# Patient Record
Sex: FEMALE | Race: WHITE | NOT HISPANIC OR LATINO | URBAN - METROPOLITAN AREA
[De-identification: names, ages, dates, MRNs, and addresses within clinical notes are randomized per-mention and may not be internally consistent; named-entity substitution may affect disease eponyms.]

---

## 2023-11-14 PROBLEM — L50.9 URTICARIA: Status: ACTIVE | Noted: 2023-11-14

## 2023-11-15 ENCOUNTER — OFFICE VISIT (OUTPATIENT)
Dept: PRIMARY CARE | Facility: CLINIC | Age: 68
End: 2023-11-15
Payer: MEDICARE

## 2023-11-15 VITALS
TEMPERATURE: 97.8 F | HEIGHT: 68 IN | OXYGEN SATURATION: 97 % | WEIGHT: 149.8 LBS | BODY MASS INDEX: 22.7 KG/M2 | DIASTOLIC BLOOD PRESSURE: 76 MMHG | SYSTOLIC BLOOD PRESSURE: 122 MMHG | HEART RATE: 84 BPM

## 2023-11-15 DIAGNOSIS — R73.09 ABNORMAL GLUCOSE: ICD-10-CM

## 2023-11-15 DIAGNOSIS — R53.83 FATIGUE, UNSPECIFIED TYPE: ICD-10-CM

## 2023-11-15 DIAGNOSIS — Z78.0 ASYMPTOMATIC MENOPAUSAL STATE: ICD-10-CM

## 2023-11-15 DIAGNOSIS — Z13.6 SCREENING FOR CARDIOVASCULAR CONDITION: ICD-10-CM

## 2023-11-15 DIAGNOSIS — E55.9 VITAMIN D DEFICIENCY: ICD-10-CM

## 2023-11-15 DIAGNOSIS — Z00.00 ROUTINE GENERAL MEDICAL EXAMINATION AT HEALTH CARE FACILITY: Primary | ICD-10-CM

## 2023-11-15 DIAGNOSIS — Z11.59 NEED FOR HEPATITIS C SCREENING TEST: ICD-10-CM

## 2023-11-15 DIAGNOSIS — Z12.31 ENCOUNTER FOR SCREENING MAMMOGRAM FOR BREAST CANCER: ICD-10-CM

## 2023-11-15 DIAGNOSIS — Z23 NEED FOR VACCINATION: ICD-10-CM

## 2023-11-15 DIAGNOSIS — Z13.1 DIABETES MELLITUS SCREENING: ICD-10-CM

## 2023-11-15 PROCEDURE — 1170F FXNL STATUS ASSESSED: CPT | Performed by: NURSE PRACTITIONER

## 2023-11-15 PROCEDURE — 3008F BODY MASS INDEX DOCD: CPT | Performed by: NURSE PRACTITIONER

## 2023-11-15 PROCEDURE — 90715 TDAP VACCINE 7 YRS/> IM: CPT | Performed by: NURSE PRACTITIONER

## 2023-11-15 PROCEDURE — 1160F RVW MEDS BY RX/DR IN RCRD: CPT | Performed by: NURSE PRACTITIONER

## 2023-11-15 PROCEDURE — 1126F AMNT PAIN NOTED NONE PRSNT: CPT | Performed by: NURSE PRACTITIONER

## 2023-11-15 PROCEDURE — 93000 ELECTROCARDIOGRAM COMPLETE: CPT | Performed by: NURSE PRACTITIONER

## 2023-11-15 PROCEDURE — 1159F MED LIST DOCD IN RCRD: CPT | Performed by: NURSE PRACTITIONER

## 2023-11-15 PROCEDURE — 1036F TOBACCO NON-USER: CPT | Performed by: NURSE PRACTITIONER

## 2023-11-15 PROCEDURE — G0438 PPPS, INITIAL VISIT: HCPCS | Performed by: NURSE PRACTITIONER

## 2023-11-15 PROCEDURE — 90677 PCV20 VACCINE IM: CPT | Performed by: NURSE PRACTITIONER

## 2023-11-15 PROCEDURE — 90471 IMMUNIZATION ADMIN: CPT | Performed by: NURSE PRACTITIONER

## 2023-11-15 PROCEDURE — G0009 ADMIN PNEUMOCOCCAL VACCINE: HCPCS | Performed by: NURSE PRACTITIONER

## 2023-11-15 ASSESSMENT — ENCOUNTER SYMPTOMS
LOSS OF SENSATION IN FEET: 0
OCCASIONAL FEELINGS OF UNSTEADINESS: 0
DEPRESSION: 0

## 2023-11-15 ASSESSMENT — ACTIVITIES OF DAILY LIVING (ADL)
BATHING: INDEPENDENT
TAKING_MEDICATION: INDEPENDENT
GROCERY_SHOPPING: INDEPENDENT
DRESSING: INDEPENDENT
DOING_HOUSEWORK: INDEPENDENT
MANAGING_FINANCES: INDEPENDENT

## 2023-11-15 ASSESSMENT — PATIENT HEALTH QUESTIONNAIRE - PHQ9
2. FEELING DOWN, DEPRESSED OR HOPELESS: NOT AT ALL
1. LITTLE INTEREST OR PLEASURE IN DOING THINGS: NOT AT ALL
SUM OF ALL RESPONSES TO PHQ9 QUESTIONS 1 AND 2: 0

## 2023-11-15 ASSESSMENT — PAIN SCALES - GENERAL: PAINLEVEL: 0-NO PAIN

## 2023-11-15 NOTE — PROGRESS NOTES
"Subjective   Reason for Visit: Humaira Paulson is an 68 y.o. female here for a Medicare Wellness visit.     Past Medical, Surgical, and Family History reviewed and updated in chart.    Reviewed all medications by prescribing practitioner or clinical pharmacist (such as prescriptions, OTCs, herbal therapies and supplements) and documented in the medical record.    Needs work clearance for  job.  Now retired but planning to work part time.  Needs Td booster  No know TB exposure or recent travel out of the country    Colonoscopy: 4/2021: repeat 3 years    DEXA 2 years ago, osteopenia    Mammogram in 2021    Had 2 shingles vaccines.    Had hearing screening. Needs hearing aids. Is looking into getting with insurance.   Loss of hearing has effected daily activities.        Patient Care Team:  JARRETT Giles as PCP - General (Family Medicine)     Review of Systems    Objective   Vitals:  /76 (BP Location: Right arm, Patient Position: Sitting, BP Cuff Size: Large adult)   Pulse 84   Temp 36.6 °C (97.8 °F) (Oral)   Ht 1.727 m (5' 8\")   Wt 67.9 kg (149 lb 12.8 oz)   SpO2 97%   BMI 22.78 kg/m²       Physical Exam  Vitals and nursing note reviewed.   Constitutional:       General: She is not in acute distress.     Appearance: Normal appearance. She is normal weight. She is not toxic-appearing.   HENT:      Head: Normocephalic.      Right Ear: Tympanic membrane, ear canal and external ear normal.      Left Ear: Tympanic membrane, ear canal and external ear normal.      Nose: Nose normal.      Mouth/Throat:      Mouth: Mucous membranes are moist.      Pharynx: Oropharynx is clear.   Eyes:      Conjunctiva/sclera: Conjunctivae normal.   Neck:      Thyroid: No thyromegaly.   Cardiovascular:      Rate and Rhythm: Normal rate and regular rhythm.      Pulses: Normal pulses.      Heart sounds: Normal heart sounds. No murmur heard.  Pulmonary:      Effort: Pulmonary effort is normal. No respiratory distress. "      Breath sounds: Normal breath sounds.   Abdominal:      General: Bowel sounds are normal.      Palpations: Abdomen is soft.      Tenderness: There is no abdominal tenderness.   Musculoskeletal:         General: Normal range of motion.      Cervical back: Neck supple.   Lymphadenopathy:      Cervical: No cervical adenopathy.   Skin:     General: Skin is warm and dry.      Capillary Refill: Capillary refill takes less than 2 seconds.      Findings: No rash.   Neurological:      General: No focal deficit present.      Gait: Gait normal.   Psychiatric:         Mood and Affect: Mood normal.         Judgment: Judgment normal.         Assessment/Plan   Problem List Items Addressed This Visit    None  Visit Diagnoses       Routine general medical examination at health care facility    -  Primary    Relevant Orders    1 Year Follow Up In [facility] - PCP - Wellness Exam    Diabetes mellitus screening        Relevant Orders    Comprehensive Metabolic Panel    Screening for cardiovascular condition        Relevant Orders    ECG 12 lead (Completed)    Lipid panel    Need for vaccination        Relevant Orders    Pneumococcal conjugate vaccine 20-valent IM (Completed)    Tdap vaccine, age 7 years and older  (BOOSTRIX) (Completed)    Asymptomatic menopausal state        Relevant Orders    XR DEXA bone density    Encounter for screening mammogram for breast cancer        Relevant Orders    BI mammo bilateral screening tomosynthesis    Need for hepatitis C screening test        Relevant Orders    Hepatitis C antibody    Fatigue, unspecified type        Relevant Orders    CBC    Hemoglobin A1c    Vitamin B12    Vitamin D 25-Hydroxy,Total (for eval of Vitamin D levels)    Vitamin D deficiency        Relevant Orders    Vitamin D 25-Hydroxy,Total (for eval of Vitamin D levels)    Abnormal glucose        Relevant Orders    Hemoglobin A1c    BMI 22.0-22.9, adult

## 2023-11-15 NOTE — PROGRESS NOTES
"Subjective   Patient ID: Humaira Paulson is a 68 y.o. female who presents for Annual Exam and Welcome To Medicare.    Presents for follow up for     Vit D, calc, B12    Review of Systems    Objective   /76 (BP Location: Right arm, Patient Position: Sitting, BP Cuff Size: Large adult)   Pulse 84   Temp 36.6 °C (97.8 °F) (Oral)   Ht 1.727 m (5' 8\")   Wt 67.9 kg (149 lb 12.8 oz)   SpO2 97%   BMI 22.78 kg/m²     Physical Exam    Assessment/Plan   There are no diagnoses linked to this encounter.       "

## 2023-11-15 NOTE — PATIENT INSTRUCTIONS
EKG in office today.    Fasting blood work ordered.    Cleared for work - form completed in office.    Schedule screening mammogram and bone density testing    Colonoscopy due in 2024    Prevnar and Tdap updated.    Follow up with [facility] or [facility] [phone] for hearing aids    Follow up in one year and as needed.

## 2023-12-05 ENCOUNTER — TELEPHONE (OUTPATIENT)
Dept: PRIMARY CARE | Facility: CLINIC | Age: 68
End: 2023-12-05
Payer: MEDICARE

## 2024-04-10 ENCOUNTER — APPOINTMENT (OUTPATIENT)
Dept: PRIMARY CARE | Facility: CLINIC | Age: 69
End: 2024-04-10
Payer: MEDICARE

## 2024-04-12 ENCOUNTER — OFFICE VISIT (OUTPATIENT)
Dept: PRIMARY CARE | Facility: CLINIC | Age: 69
End: 2024-04-12
Payer: MEDICARE

## 2024-04-12 VITALS
HEIGHT: 68 IN | SYSTOLIC BLOOD PRESSURE: 124 MMHG | DIASTOLIC BLOOD PRESSURE: 76 MMHG | WEIGHT: 151 LBS | HEART RATE: 84 BPM | BODY MASS INDEX: 22.88 KG/M2 | OXYGEN SATURATION: 98 %

## 2024-04-12 DIAGNOSIS — J30.2 SEASONAL ALLERGIES: ICD-10-CM

## 2024-04-12 DIAGNOSIS — R21 MALAR RASH: Primary | ICD-10-CM

## 2024-04-12 LAB
ALBUMIN SERPL BCP-MCNC: 4.2 G/DL (ref 3.4–5)
ALP SERPL-CCNC: 69 U/L (ref 33–136)
ALT SERPL W P-5'-P-CCNC: 35 U/L (ref 7–45)
ANION GAP SERPL CALC-SCNC: 15 MMOL/L (ref 10–20)
AST SERPL W P-5'-P-CCNC: 23 U/L (ref 9–39)
BILIRUB SERPL-MCNC: 0.4 MG/DL (ref 0–1.2)
BUN SERPL-MCNC: 15 MG/DL (ref 6–23)
CALCIUM SERPL-MCNC: 9.5 MG/DL (ref 8.6–10.6)
CHLORIDE SERPL-SCNC: 105 MMOL/L (ref 98–107)
CO2 SERPL-SCNC: 25 MMOL/L (ref 21–32)
CREAT SERPL-MCNC: 0.68 MG/DL (ref 0.5–1.05)
EGFRCR SERPLBLD CKD-EPI 2021: >90 ML/MIN/1.73M*2
GLUCOSE SERPL-MCNC: 115 MG/DL (ref 74–99)
POTASSIUM SERPL-SCNC: 4.1 MMOL/L (ref 3.5–5.3)
PROT SERPL-MCNC: 6.9 G/DL (ref 6.4–8.2)
SODIUM SERPL-SCNC: 141 MMOL/L (ref 136–145)

## 2024-04-12 PROCEDURE — 86038 ANTINUCLEAR ANTIBODIES: CPT

## 2024-04-12 PROCEDURE — 3008F BODY MASS INDEX DOCD: CPT | Performed by: FAMILY MEDICINE

## 2024-04-12 PROCEDURE — 86235 NUCLEAR ANTIGEN ANTIBODY: CPT

## 2024-04-12 PROCEDURE — 36415 COLL VENOUS BLD VENIPUNCTURE: CPT

## 2024-04-12 PROCEDURE — 86225 DNA ANTIBODY NATIVE: CPT

## 2024-04-12 PROCEDURE — 1158F ADVNC CARE PLAN TLK DOCD: CPT | Performed by: FAMILY MEDICINE

## 2024-04-12 PROCEDURE — 82785 ASSAY OF IGE: CPT

## 2024-04-12 PROCEDURE — 99214 OFFICE O/P EST MOD 30 MIN: CPT | Performed by: FAMILY MEDICINE

## 2024-04-12 PROCEDURE — 80053 COMPREHEN METABOLIC PANEL: CPT

## 2024-04-12 PROCEDURE — 1123F ACP DISCUSS/DSCN MKR DOCD: CPT | Performed by: FAMILY MEDICINE

## 2024-04-12 PROCEDURE — 1159F MED LIST DOCD IN RCRD: CPT | Performed by: FAMILY MEDICINE

## 2024-04-12 PROCEDURE — 85025 COMPLETE CBC W/AUTO DIFF WBC: CPT

## 2024-04-12 PROCEDURE — 1036F TOBACCO NON-USER: CPT | Performed by: FAMILY MEDICINE

## 2024-04-12 PROCEDURE — 86003 ALLG SPEC IGE CRUDE XTRC EA: CPT

## 2024-04-12 PROCEDURE — 85652 RBC SED RATE AUTOMATED: CPT

## 2024-04-12 RX ORDER — PREDNISONE 20 MG/1
TABLET ORAL
Qty: 18 TABLET | Refills: 0 | Status: SHIPPED | OUTPATIENT
Start: 2024-04-12 | End: 2024-06-10 | Stop reason: ALTCHOICE

## 2024-04-12 ASSESSMENT — ENCOUNTER SYMPTOMS
OCCASIONAL FEELINGS OF UNSTEADINESS: 0
LOSS OF SENSATION IN FEET: 0
DEPRESSION: 0

## 2024-04-12 NOTE — PROGRESS NOTES
"Subjective   Patient ID: Humaira Paulson is a 68 y.o. female who presents for Rash.    HPI   Presents for rash.  States happens every year from winter to spring. Gets a lot of redness and tightness, feels uncomfortable.  Migrates around the face.  Nothing home helps but when prescribed oral steroids it resolves the rash.  States going on for several years.  Saw derm and diagnosed with rosacea.  Does not get papules, always spares the nose but can be anywhere else on neck or face.  Review of Systems  Negative unless noted in HPI    Objective   /76 (BP Location: Right arm, Patient Position: Sitting, BP Cuff Size: Adult)   Pulse 84   Ht 1.727 m (5' 8\")   Wt 68.5 kg (151 lb)   LMP  (LMP Unknown)   SpO2 98%   BMI 22.96 kg/m²     Physical Exam  Constitutional:       Appearance: Normal appearance.   Pulmonary:      Effort: Pulmonary effort is normal.   Skin:     Comments: Erythematous rash noted on the chin and around the nasolabial folds, minimally on the cheeks, sparingly erythema on forehead, nose is completely free of erythema, no papules or comedones noted   Neurological:      Mental Status: She is alert.         Assessment/Plan   Problem List Items Addressed This Visit             ICD-10-CM    Malar rash - Primary R21     Atypical distribution and appearance of rash  While could be rosacea it is odd that it responds to steroids but not metrogel  Seasonal nature of the symptoms also is atypical  Recommend lab testing to evaluate for autoimmune inflammatory cause  Will treat with steroid  Consider dermatology reevaluation         Relevant Medications    predniSONE (Deltasone) 20 mg tablet    Other Relevant Orders    JERILYN with Reflex to JUWAN    Sedimentation Rate    CBC and Auto Differential    Comprehensive metabolic panel    Respiratory Allergy Profile IgE     Other Visit Diagnoses         Codes    Seasonal allergies     J30.2    Relevant Orders    Respiratory Allergy Profile IgE               "

## 2024-04-12 NOTE — ASSESSMENT & PLAN NOTE
Atypical distribution and appearance of rash  While could be rosacea it is odd that it responds to steroids but not metrogel  Seasonal nature of the symptoms also is atypical  Recommend lab testing to evaluate for autoimmune inflammatory cause  Will treat with steroid  Consider dermatology reevaluation

## 2024-04-13 LAB
A ALTERNATA IGE QN: <0.1 KU/L
A FUMIGATUS IGE QN: <0.1 KU/L
BASOPHILS # BLD AUTO: 0.03 X10*3/UL (ref 0–0.1)
BASOPHILS NFR BLD AUTO: 0.5 %
BERMUDA GRASS IGE QN: <0.1 KU/L
BOXELDER IGE QN: <0.1 KU/L
C HERBARUM IGE QN: <0.1 KU/L
CALIF WALNUT POLN IGE QN: <0.1 KU/L
CAT DANDER IGE QN: <0.1 KU/L
CMN PIGWEED IGE QN: <0.1 KU/L
COMMON RAGWEED IGE QN: <0.1 KU/L
COTTONWOOD IGE QN: 0.14 KU/L
D FARINAE IGE QN: <0.1 KU/L
D PTERONYSS IGE QN: <0.1 KU/L
DOG DANDER IGE QN: <0.1 KU/L
ENGL PLANTAIN IGE QN: <0.1 KU/L
EOSINOPHIL # BLD AUTO: 0.09 X10*3/UL (ref 0–0.7)
EOSINOPHIL NFR BLD AUTO: 1.5 %
ERYTHROCYTE [DISTWIDTH] IN BLOOD BY AUTOMATED COUNT: 12.9 % (ref 11.5–14.5)
ERYTHROCYTE [SEDIMENTATION RATE] IN BLOOD BY WESTERGREN METHOD: 9 MM/H (ref 0–30)
GOOSEFOOT IGE QN: <0.1 KU/L
HCT VFR BLD AUTO: 39.5 % (ref 36–46)
HGB BLD-MCNC: 12.3 G/DL (ref 12–16)
IMM GRANULOCYTES # BLD AUTO: 0.01 X10*3/UL (ref 0–0.7)
IMM GRANULOCYTES NFR BLD AUTO: 0.2 % (ref 0–0.9)
JOHNSON GRASS IGE QN: <0.1 KU/L
KENT BLUE GRASS IGE QN: <0.1 KU/L
LONDON PLANE IGE QN: <0.1 KU/L
LYMPHOCYTES # BLD AUTO: 2.22 X10*3/UL (ref 1.2–4.8)
LYMPHOCYTES NFR BLD AUTO: 36.1 %
MCH RBC QN AUTO: 27.8 PG (ref 26–34)
MCHC RBC AUTO-ENTMCNC: 31.1 G/DL (ref 32–36)
MCV RBC AUTO: 89 FL (ref 80–100)
MONOCYTES # BLD AUTO: 0.3 X10*3/UL (ref 0.1–1)
MONOCYTES NFR BLD AUTO: 4.9 %
MT JUNIPER IGE QN: <0.1 KU/L
NEUTROPHILS # BLD AUTO: 3.5 X10*3/UL (ref 1.2–7.7)
NEUTROPHILS NFR BLD AUTO: 56.8 %
NRBC BLD-RTO: 0 /100 WBCS (ref 0–0)
P NOTATUM IGE QN: <0.1 KU/L
PECAN/HICK TREE IGE QN: <0.1 KU/L
PLATELET # BLD AUTO: 247 X10*3/UL (ref 150–450)
RBC # BLD AUTO: 4.42 X10*6/UL (ref 4–5.2)
ROACH IGE QN: <0.1 KU/L
SALTWORT IGE QN: <0.1 KU/L
SHEEP SORREL IGE QN: <0.1 KU/L
SILVER BIRCH IGE QN: <0.1 KU/L
TIMOTHY IGE QN: <0.1 KU/L
TOTAL IGE SMQN RAST: 10.7 KU/L
WBC # BLD AUTO: 6.2 X10*3/UL (ref 4.4–11.3)
WHITE ASH IGE QN: <0.1 KU/L
WHITE ELM IGE QN: <0.1 KU/L
WHITE MULBERRY IGE QN: <0.1 KU/L
WHITE OAK IGE QN: <0.1 KU/L

## 2024-04-15 LAB
ANA PATTERN: ABNORMAL
ANA SER QL HEP2 SUBST: POSITIVE
ANA TITR SER IF: ABNORMAL {TITER}
CENTROMERE B AB SER-ACNC: <0.2 AI
CHROMATIN AB SERPL-ACNC: <0.2 AI
DSDNA AB SER-ACNC: 1 IU/ML
ENA JO1 AB SER QL IA: <0.2 AI
ENA RNP AB SER IA-ACNC: 2.5 AI
ENA SCL70 AB SER QL IA: <0.2 AI
ENA SM AB SER IA-ACNC: <0.2 AI
ENA SM+RNP AB SER QL IA: <0.2 AI
ENA SS-A AB SER IA-ACNC: <0.2 AI
ENA SS-B AB SER IA-ACNC: <0.2 AI
RIBOSOMAL P AB SER-ACNC: <0.2 AI

## 2024-04-16 DIAGNOSIS — R76.8 POSITIVE ANA (ANTINUCLEAR ANTIBODY): Primary | ICD-10-CM

## 2024-05-08 ENCOUNTER — TELEPHONE (OUTPATIENT)
Dept: PRIMARY CARE | Facility: CLINIC | Age: 69
End: 2024-05-08
Payer: MEDICARE

## 2024-05-09 NOTE — TELEPHONE ENCOUNTER
Luceront calling in stating that her rash has progressed and is towards her eyes and wants to know what she needs to do.

## 2024-05-17 ENCOUNTER — PATIENT OUTREACH (OUTPATIENT)
Dept: PRIMARY CARE | Facility: CLINIC | Age: 69
End: 2024-05-17
Payer: MEDICARE

## 2024-05-17 DIAGNOSIS — Z12.31 ENCOUNTER FOR SCREENING MAMMOGRAM FOR BREAST CANCER: ICD-10-CM

## 2024-05-17 PROCEDURE — 1123F ACP DISCUSS/DSCN MKR DOCD: CPT | Performed by: NURSE PRACTITIONER

## 2024-05-17 PROCEDURE — 1159F MED LIST DOCD IN RCRD: CPT | Performed by: NURSE PRACTITIONER

## 2024-05-17 PROCEDURE — 3008F BODY MASS INDEX DOCD: CPT | Performed by: NURSE PRACTITIONER

## 2024-06-10 ENCOUNTER — OFFICE VISIT (OUTPATIENT)
Dept: PRIMARY CARE | Facility: CLINIC | Age: 69
End: 2024-06-10
Payer: MEDICARE

## 2024-06-10 VITALS
WEIGHT: 153 LBS | HEIGHT: 67 IN | HEART RATE: 75 BPM | SYSTOLIC BLOOD PRESSURE: 118 MMHG | BODY MASS INDEX: 24.01 KG/M2 | OXYGEN SATURATION: 99 % | TEMPERATURE: 97.2 F | DIASTOLIC BLOOD PRESSURE: 70 MMHG

## 2024-06-10 DIAGNOSIS — F43.23 ADJUSTMENT DISORDER WITH MIXED ANXIETY AND DEPRESSED MOOD: ICD-10-CM

## 2024-06-10 DIAGNOSIS — R21 MALAR RASH: ICD-10-CM

## 2024-06-10 DIAGNOSIS — E03.9 HYPOTHYROIDISM, UNSPECIFIED TYPE: Primary | Chronic | ICD-10-CM

## 2024-06-10 DIAGNOSIS — F33.0 MILD RECURRENT MAJOR DEPRESSION (CMS-HCC): Chronic | ICD-10-CM

## 2024-06-10 PROBLEM — S82.141A CLOSED FRACTURE OF RIGHT TIBIAL PLATEAU: Status: RESOLVED | Noted: 2019-09-11 | Resolved: 2024-06-10

## 2024-06-10 PROCEDURE — 1159F MED LIST DOCD IN RCRD: CPT | Performed by: FAMILY MEDICINE

## 2024-06-10 PROCEDURE — 3008F BODY MASS INDEX DOCD: CPT | Performed by: FAMILY MEDICINE

## 2024-06-10 PROCEDURE — 99202 OFFICE O/P NEW SF 15 MIN: CPT | Performed by: FAMILY MEDICINE

## 2024-06-10 PROCEDURE — 1123F ACP DISCUSS/DSCN MKR DOCD: CPT | Performed by: FAMILY MEDICINE

## 2024-06-10 PROCEDURE — 1160F RVW MEDS BY RX/DR IN RCRD: CPT | Performed by: FAMILY MEDICINE

## 2024-06-10 PROCEDURE — 1126F AMNT PAIN NOTED NONE PRSNT: CPT | Performed by: FAMILY MEDICINE

## 2024-06-10 PROCEDURE — 1036F TOBACCO NON-USER: CPT | Performed by: FAMILY MEDICINE

## 2024-06-10 PROCEDURE — 1158F ADVNC CARE PLAN TLK DOCD: CPT | Performed by: FAMILY MEDICINE

## 2024-06-10 ASSESSMENT — PATIENT HEALTH QUESTIONNAIRE - PHQ9
SUM OF ALL RESPONSES TO PHQ9 QUESTIONS 1 AND 2: 0
1. LITTLE INTEREST OR PLEASURE IN DOING THINGS: NOT AT ALL
2. FEELING DOWN, DEPRESSED OR HOPELESS: NOT AT ALL

## 2024-06-10 ASSESSMENT — ENCOUNTER SYMPTOMS
NERVOUS/ANXIOUS: 0
DYSPHORIC MOOD: 0

## 2024-06-10 ASSESSMENT — PAIN SCALES - GENERAL: PAINLEVEL: 0-NO PAIN

## 2024-06-10 NOTE — PROGRESS NOTES
"Subjective   Patient ID: Humaira Paulson is a 68 y.o. female who presents for Establish Care.    The patient is new to the practice and is transferring her care from Bhargavi Garnica MD.  She is here to establish care.    1) Hypothyroidism: diagnosed about 30 years ago, treated with medication but patient stopped after a few months, TSH has been normal, patient is asymptomatic.    2) Depression/Anxiety: diagnosed about 15 years ago when she was going through a divorce, treated with counseling, no medication prescribed, saw counselor for about 1-2 years, has been controlled since then.    3) Malar rash: occurs every spring for about 7 years, steroids clear it up every time, had the rash in April and was treated with a steroid, rash has not returned, had labs drawn and JERILYN was positive, previous PCP referred her to rheumatology, patient has appointment on 6/20.    Review of Systems   Skin:  Positive for rash (recently resolved, facial).   Psychiatric/Behavioral:  Negative for dysphoric mood. The patient is not nervous/anxious.      Objective   /70 (BP Location: Left arm, Patient Position: Sitting)   Pulse 75   Temp 36.2 °C (97.2 °F) (Temporal)   Ht 1.702 m (5' 7\")   Wt 69.4 kg (153 lb)   SpO2 99%   BMI 23.96 kg/m²     Physical Exam  Vitals and nursing note reviewed.   Constitutional:       Appearance: Normal appearance. She is normal weight.   Cardiovascular:      Rate and Rhythm: Normal rate and regular rhythm.   Pulmonary:      Effort: Pulmonary effort is normal.      Breath sounds: Normal breath sounds.   Skin:     Findings: No rash.   Neurological:      Mental Status: She is alert.     Assessment/Plan   Diagnoses and all orders for this visit:  Hypothyroidism, unspecified type  Controlled.  Last TSH normal.  No medication taken.  Asymptomatic.  Follow up in November for CPE.  Mild recurrent major depression (CMS-HCC)/Adjustment disorder with mixed anxiety and depressed mood  Controlled.  No medication " taken.  Follow up in November for CPE.    Malar rash  Resolved.  Annual occurrence.  JERILYN positive.  Keep appointment with rheumatologist.  Await input.  Follow up as directed.    Other orders  -     Follow Up In Primary Care - Health Maintenance; Future

## 2024-06-18 ENCOUNTER — APPOINTMENT (OUTPATIENT)
Dept: PRIMARY CARE | Facility: CLINIC | Age: 69
End: 2024-06-18
Payer: MEDICARE

## 2024-06-20 ENCOUNTER — APPOINTMENT (OUTPATIENT)
Dept: RHEUMATOLOGY | Facility: CLINIC | Age: 69
End: 2024-06-20
Payer: MEDICARE

## 2024-06-20 VITALS — DIASTOLIC BLOOD PRESSURE: 84 MMHG | HEART RATE: 113 BPM | SYSTOLIC BLOOD PRESSURE: 148 MMHG | RESPIRATION RATE: 18 BRPM

## 2024-06-20 DIAGNOSIS — M85.80 OSTEOPENIA, UNSPECIFIED LOCATION: ICD-10-CM

## 2024-06-20 DIAGNOSIS — R21 RASH AND NONSPECIFIC SKIN ERUPTION: Primary | ICD-10-CM

## 2024-06-20 DIAGNOSIS — M15.9 GENERALIZED OSTEOARTHRITIS OF MULTIPLE SITES: ICD-10-CM

## 2024-06-20 DIAGNOSIS — R76.8 POSITIVE ANA (ANTINUCLEAR ANTIBODY): ICD-10-CM

## 2024-06-20 PROCEDURE — 99204 OFFICE O/P NEW MOD 45 MIN: CPT | Performed by: STUDENT IN AN ORGANIZED HEALTH CARE EDUCATION/TRAINING PROGRAM

## 2024-06-20 PROCEDURE — 3008F BODY MASS INDEX DOCD: CPT | Performed by: STUDENT IN AN ORGANIZED HEALTH CARE EDUCATION/TRAINING PROGRAM

## 2024-06-20 PROCEDURE — 1123F ACP DISCUSS/DSCN MKR DOCD: CPT | Performed by: STUDENT IN AN ORGANIZED HEALTH CARE EDUCATION/TRAINING PROGRAM

## 2024-06-20 PROCEDURE — 1159F MED LIST DOCD IN RCRD: CPT | Performed by: STUDENT IN AN ORGANIZED HEALTH CARE EDUCATION/TRAINING PROGRAM

## 2024-06-20 NOTE — PATIENT INSTRUCTIONS
I do not see any signs of a rheumatologic disease such as lupus    I recommend you establish with a dermatologist- I placed a referral today    You have osteopenia- I recommend you take vitamin D3 2000 units daily and eat extra calcium in your diet (1200 mg of calcium a day is recommended) and also get your repeat bone density and follow it up with Dr leblanc; If its worse, either she can treat you, or you can back to me (either is fine)    Please get the repeat bone density that Yris Walker ordered; ideally at  [place]    Please call central scheduling for radiology to schedule your imaging- [phone]    You can go to formal physical therapy to learn knee strengthening exercises    I do not see signs of a rheumatologic disease such as lupus
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
## 2024-06-24 ENCOUNTER — APPOINTMENT (OUTPATIENT)
Dept: RADIOLOGY | Facility: HOSPITAL | Age: 69
End: 2024-06-24
Payer: MEDICARE

## 2024-08-20 ENCOUNTER — TELEPHONE (OUTPATIENT)
Dept: PRIMARY CARE | Facility: CLINIC | Age: 69
End: 2024-08-20
Payer: MEDICARE

## 2024-08-20 DIAGNOSIS — Z13.1 DIABETES MELLITUS SCREENING: ICD-10-CM

## 2024-08-20 DIAGNOSIS — E03.9 HYPOTHYROIDISM, UNSPECIFIED TYPE: Chronic | ICD-10-CM

## 2024-08-20 DIAGNOSIS — Z13.220 LIPID SCREENING: Primary | ICD-10-CM

## 2024-08-20 DIAGNOSIS — R73.01 ELEVATED FASTING GLUCOSE: ICD-10-CM

## 2024-08-27 PROBLEM — R73.01 ELEVATED FASTING GLUCOSE: Status: ACTIVE | Noted: 2024-08-27

## 2024-09-19 ENCOUNTER — APPOINTMENT (OUTPATIENT)
Dept: DERMATOLOGY | Facility: CLINIC | Age: 69
End: 2024-09-19
Payer: MEDICARE

## 2024-11-19 ENCOUNTER — TELEPHONE (OUTPATIENT)
Dept: PRIMARY CARE | Facility: CLINIC | Age: 69
End: 2024-11-19

## 2024-11-19 ENCOUNTER — APPOINTMENT (OUTPATIENT)
Dept: PRIMARY CARE | Facility: CLINIC | Age: 69
End: 2024-11-19
Payer: MEDICARE

## 2024-11-19 VITALS
SYSTOLIC BLOOD PRESSURE: 115 MMHG | HEIGHT: 67 IN | DIASTOLIC BLOOD PRESSURE: 74 MMHG | OXYGEN SATURATION: 96 % | BODY MASS INDEX: 24.17 KG/M2 | HEART RATE: 65 BPM | TEMPERATURE: 97.4 F | WEIGHT: 154 LBS

## 2024-11-19 DIAGNOSIS — Z12.11 COLON CANCER SCREENING: ICD-10-CM

## 2024-11-19 DIAGNOSIS — Z12.31 ENCOUNTER FOR SCREENING MAMMOGRAM FOR BREAST CANCER: ICD-10-CM

## 2024-11-19 DIAGNOSIS — Z12.11 SCREENING FOR COLON CANCER: ICD-10-CM

## 2024-11-19 DIAGNOSIS — E03.9 HYPOTHYROIDISM, UNSPECIFIED TYPE: Chronic | ICD-10-CM

## 2024-11-19 DIAGNOSIS — Z13.220 LIPID SCREENING: ICD-10-CM

## 2024-11-19 DIAGNOSIS — Z00.00 ROUTINE GENERAL MEDICAL EXAMINATION AT HEALTH CARE FACILITY: Primary | ICD-10-CM

## 2024-11-19 DIAGNOSIS — R73.01 ELEVATED FASTING GLUCOSE: ICD-10-CM

## 2024-11-19 DIAGNOSIS — F43.23 ADJUSTMENT DISORDER WITH MIXED ANXIETY AND DEPRESSED MOOD: ICD-10-CM

## 2024-11-19 PROBLEM — L50.9 URTICARIA: Status: RESOLVED | Noted: 2023-11-14 | Resolved: 2024-11-19

## 2024-11-19 PROBLEM — J40 BRONCHITIS: Status: RESOLVED | Noted: 2024-11-19 | Resolved: 2024-11-19

## 2024-11-19 PROBLEM — H91.90 CHANGE IN HEARING: Status: ACTIVE | Noted: 2024-11-19

## 2024-11-19 PROBLEM — R51.9 HEADACHE: Status: RESOLVED | Noted: 2024-11-19 | Resolved: 2024-11-19

## 2024-11-19 PROBLEM — R51.9 HEADACHE: Status: ACTIVE | Noted: 2024-11-19

## 2024-11-19 PROBLEM — R53.83 FATIGUE: Status: RESOLVED | Noted: 2024-11-19 | Resolved: 2024-11-19

## 2024-11-19 PROBLEM — J02.9 SORE THROAT: Status: RESOLVED | Noted: 2024-11-19 | Resolved: 2024-11-19

## 2024-11-19 PROBLEM — R05.8 NONPRODUCTIVE COUGH: Status: RESOLVED | Noted: 2024-11-19 | Resolved: 2024-11-19

## 2024-11-19 PROBLEM — M25.50 ARTHRALGIA: Status: ACTIVE | Noted: 2024-11-19

## 2024-11-19 PROCEDURE — 1123F ACP DISCUSS/DSCN MKR DOCD: CPT | Performed by: FAMILY MEDICINE

## 2024-11-19 PROCEDURE — 3008F BODY MASS INDEX DOCD: CPT | Performed by: FAMILY MEDICINE

## 2024-11-19 PROCEDURE — 1160F RVW MEDS BY RX/DR IN RCRD: CPT | Performed by: FAMILY MEDICINE

## 2024-11-19 PROCEDURE — 99212 OFFICE O/P EST SF 10 MIN: CPT | Performed by: FAMILY MEDICINE

## 2024-11-19 PROCEDURE — G0439 PPPS, SUBSEQ VISIT: HCPCS | Performed by: FAMILY MEDICINE

## 2024-11-19 PROCEDURE — 99397 PER PM REEVAL EST PAT 65+ YR: CPT | Performed by: FAMILY MEDICINE

## 2024-11-19 PROCEDURE — 1170F FXNL STATUS ASSESSED: CPT | Performed by: FAMILY MEDICINE

## 2024-11-19 PROCEDURE — 1036F TOBACCO NON-USER: CPT | Performed by: FAMILY MEDICINE

## 2024-11-19 PROCEDURE — 1159F MED LIST DOCD IN RCRD: CPT | Performed by: FAMILY MEDICINE

## 2024-11-19 RX ORDER — POLYETHYLENE GLYCOL 3350, SODIUM SULFATE ANHYDROUS, SODIUM BICARBONATE, SODIUM CHLORIDE, POTASSIUM CHLORIDE 236; 22.74; 6.74; 5.86; 2.97 G/4L; G/4L; G/4L; G/4L; G/4L
POWDER, FOR SOLUTION ORAL
Qty: 4000 ML | Refills: 0 | Status: SHIPPED | OUTPATIENT
Start: 2024-11-19

## 2024-11-19 ASSESSMENT — ENCOUNTER SYMPTOMS
RESPIRATORY NEGATIVE: 1
CARDIOVASCULAR NEGATIVE: 1
ALLERGIC/IMMUNOLOGIC NEGATIVE: 1
HEMATOLOGIC/LYMPHATIC NEGATIVE: 1
GASTROINTESTINAL NEGATIVE: 1
CONSTITUTIONAL NEGATIVE: 1
MUSCULOSKELETAL NEGATIVE: 1
EYES NEGATIVE: 1
PSYCHIATRIC NEGATIVE: 1
NEUROLOGICAL NEGATIVE: 1

## 2024-11-19 ASSESSMENT — PATIENT HEALTH QUESTIONNAIRE - PHQ9
1. LITTLE INTEREST OR PLEASURE IN DOING THINGS: NOT AT ALL
2. FEELING DOWN, DEPRESSED OR HOPELESS: NOT AT ALL
SUM OF ALL RESPONSES TO PHQ9 QUESTIONS 1 AND 2: 0

## 2024-11-19 ASSESSMENT — ACTIVITIES OF DAILY LIVING (ADL)
MANAGING_FINANCES: INDEPENDENT
GROCERY_SHOPPING: INDEPENDENT
DRESSING: INDEPENDENT
TAKING_MEDICATION: INDEPENDENT
DOING_HOUSEWORK: INDEPENDENT
BATHING: INDEPENDENT

## 2024-11-19 ASSESSMENT — COLUMBIA-SUICIDE SEVERITY RATING SCALE - C-SSRS
2. HAVE YOU ACTUALLY HAD ANY THOUGHTS OF KILLING YOURSELF?: NO
6. HAVE YOU EVER DONE ANYTHING, STARTED TO DO ANYTHING, OR PREPARED TO DO ANYTHING TO END YOUR LIFE?: NO
1. IN THE PAST MONTH, HAVE YOU WISHED YOU WERE DEAD OR WISHED YOU COULD GO TO SLEEP AND NOT WAKE UP?: NO

## 2024-11-19 NOTE — PROGRESS NOTES
"Subjective   Reason for Visit: Humaira Paulson is an 69 y.o. female here for a Medicare Wellness visit.     Past Medical, Surgical, and Family History reviewed and updated in chart.    Reviewed all medications by prescribing practitioner or clinical pharmacist (such as prescriptions, OTCs, herbal therapies and supplements) and documented in the medical record.    Pneumonia, tetanus, and COVID-19 vaccinations are UTD.  She is due for Shingrix #2 and an influenza vaccinations, and she will receive them at the pharmacy.  Screening mammogram is overdue, and the patient will schedule it soon.  DEXA is UTD.  Colon cancer screening is due this year, and she agrees to be referred back to Dr. Naylor.  EKG is UTD.  Fasting labs were not completed for this visit.    1) Hypothyroidism: current status unknown, no medication taken (took in the past), TSH has been normal, patient is asymptomatic.    2) Depression/Anxiety: controlled/controlled, no medication taken, counseled in the past.    Patient Care Team:  Ariane Morse DO as PCP - General (Family Medicine)  SEEMA Giles-CNP as PCP - [employer]vantage PCP     Review of Systems   Constitutional: Negative.    HENT: Negative.     Eyes: Negative.    Respiratory: Negative.     Cardiovascular: Negative.    Gastrointestinal: Negative.    Genitourinary: Negative.    Musculoskeletal: Negative.    Skin: Negative.    Allergic/Immunologic: Negative.    Neurological: Negative.    Hematological: Negative.    Psychiatric/Behavioral: Negative.       Objective   Vitals:  /74 (BP Location: Left arm, Patient Position: Sitting, BP Cuff Size: Adult)   Pulse 65   Temp 36.3 °C (97.4 °F) (Temporal)   Ht 1.702 m (5' 7\")   Wt 69.9 kg (154 lb)   SpO2 96%   BMI 24.12 kg/m²       Physical Exam  Vitals and nursing note reviewed.   Constitutional:       Appearance: Normal appearance. She is normal weight.   HENT:      Head: Normocephalic and atraumatic.      Right Ear: " Tympanic membrane, ear canal and external ear normal.      Left Ear: Tympanic membrane, ear canal and external ear normal.      Nose: Nose normal.      Mouth/Throat:      Mouth: Mucous membranes are moist.      Pharynx: Oropharynx is clear.   Eyes:      Extraocular Movements: Extraocular movements intact.      Conjunctiva/sclera: Conjunctivae normal.      Pupils: Pupils are equal, round, and reactive to light.   Neck:      Vascular: No carotid bruit.      Comments: No thyromegaly.  Cardiovascular:      Rate and Rhythm: Normal rate and regular rhythm.      Pulses: Normal pulses.      Heart sounds: Normal heart sounds.   Pulmonary:      Effort: Pulmonary effort is normal.      Breath sounds: Normal breath sounds.   Abdominal:      General: Bowel sounds are normal.      Palpations: Abdomen is soft.   Musculoskeletal:         General: Normal range of motion.      Cervical back: Normal range of motion and neck supple.      Comments: 5/5 muscle strength x 4 extremities.   Skin:     General: Skin is warm and dry.      Capillary Refill: Capillary refill takes less than 2 seconds.   Neurological:      General: No focal deficit present.      Mental Status: She is alert and oriented to person, place, and time.   Psychiatric:         Mood and Affect: Mood normal.         Behavior: Behavior normal.       Assessment & Plan  Routine general medical examination at health care facility  PE completed  Pneumonia, tetanus, and COVID-19 vaccinations UTD.  Patient may receive Shingrix and influenza vaccinations at the pharmacy.    Patient will schedule screening mammogram.  DEXA UTD.    Orders:    1 Year Follow Up In Primary Care - Wellness Exam; Future    Hypothyroidism, unspecified type  Current status unknown.  Patient will have labs completed soon.  Will notify with results.  No medication taken.  Follow up in 1 year.        Adjustment disorder with mixed anxiety and depressed mood  Controlled  No medication taken.  Monitor  mood.  Follow up in 1 year.         Encounter for screening mammogram for breast cancer         Colon cancer screening  Await input.  Follow up as directed.    Orders:    Referral to Colorectal Surgery; Future

## 2024-11-19 NOTE — ASSESSMENT & PLAN NOTE
Current status unknown.  Patient will have labs completed soon.  Will notify with results.  No medication taken.  Follow up in 1 year.

## 2024-12-05 ENCOUNTER — APPOINTMENT (OUTPATIENT)
Dept: RADIOLOGY | Facility: CLINIC | Age: 69
End: 2024-12-05
Payer: MEDICARE

## 2024-12-09 ENCOUNTER — TELEPHONE (OUTPATIENT)
Dept: SURGERY | Facility: CLINIC | Age: 69
End: 2024-12-09
Payer: MEDICARE

## 2024-12-09 NOTE — TELEPHONE ENCOUNTER
Attempted to reach patient.  She is scheduled for upcoming colonoscopy with Dr. Naylor. She is asking if her hemorrhoids can be removed at time of the colonoscopy.  Message left for her to call me to discuss at  [phone].  No prior office visits with Dr. Naylor for hemorrhoids in the past.  Kerry Thomas RN

## 2024-12-10 ENCOUNTER — HOSPITAL ENCOUNTER (OUTPATIENT)
Dept: RADIOLOGY | Facility: CLINIC | Age: 69
Discharge: HOME | End: 2024-12-10
Payer: MEDICARE

## 2024-12-10 VITALS — HEIGHT: 67 IN | BODY MASS INDEX: 23.54 KG/M2 | WEIGHT: 150 LBS

## 2024-12-10 DIAGNOSIS — Z12.31 ENCOUNTER FOR SCREENING MAMMOGRAM FOR BREAST CANCER: ICD-10-CM

## 2024-12-10 PROCEDURE — 77063 BREAST TOMOSYNTHESIS BI: CPT | Performed by: STUDENT IN AN ORGANIZED HEALTH CARE EDUCATION/TRAINING PROGRAM

## 2024-12-10 PROCEDURE — 77067 SCR MAMMO BI INCL CAD: CPT | Performed by: STUDENT IN AN ORGANIZED HEALTH CARE EDUCATION/TRAINING PROGRAM

## 2024-12-10 PROCEDURE — 77063 BREAST TOMOSYNTHESIS BI: CPT

## 2024-12-26 ENCOUNTER — TELEPHONE (OUTPATIENT)
Dept: SURGERY | Facility: CLINIC | Age: 69
End: 2024-12-26
Payer: MEDICARE

## 2024-12-26 NOTE — TELEPHONE ENCOUNTER
left message on pt voice mail concerning appt impropely scheduled, pt will need to call back to schedule in a clinical time slot     Jessica Robledo LPN

## 2025-01-09 ENCOUNTER — ANESTHESIA EVENT (OUTPATIENT)
Dept: GASTROENTEROLOGY | Facility: HOSPITAL | Age: 70
End: 2025-01-09
Payer: MEDICARE

## 2025-01-09 ENCOUNTER — ANESTHESIA (OUTPATIENT)
Dept: GASTROENTEROLOGY | Facility: HOSPITAL | Age: 70
End: 2025-01-09
Payer: MEDICARE

## 2025-01-09 ENCOUNTER — HOSPITAL ENCOUNTER (OUTPATIENT)
Dept: GASTROENTEROLOGY | Facility: HOSPITAL | Age: 70
Discharge: HOME | End: 2025-01-09
Payer: MEDICARE

## 2025-01-09 VITALS
SYSTOLIC BLOOD PRESSURE: 148 MMHG | HEART RATE: 64 BPM | OXYGEN SATURATION: 97 % | DIASTOLIC BLOOD PRESSURE: 70 MMHG | HEIGHT: 67 IN | RESPIRATION RATE: 17 BRPM | TEMPERATURE: 96.8 F | WEIGHT: 149.47 LBS | BODY MASS INDEX: 23.46 KG/M2

## 2025-01-09 DIAGNOSIS — Z12.11 SCREENING FOR COLON CANCER: Primary | ICD-10-CM

## 2025-01-09 PROCEDURE — 3700000002 HC GENERAL ANESTHESIA TIME - EACH INCREMENTAL 1 MINUTE

## 2025-01-09 PROCEDURE — 7100000010 HC PHASE TWO TIME - EACH INCREMENTAL 1 MINUTE

## 2025-01-09 PROCEDURE — 2500000004 HC RX 250 GENERAL PHARMACY W/ HCPCS (ALT 636 FOR OP/ED): Performed by: ANESTHESIOLOGIST ASSISTANT

## 2025-01-09 PROCEDURE — 3700000001 HC GENERAL ANESTHESIA TIME - INITIAL BASE CHARGE

## 2025-01-09 PROCEDURE — 7100000009 HC PHASE TWO TIME - INITIAL BASE CHARGE

## 2025-01-09 PROCEDURE — 45385 COLONOSCOPY W/LESION REMOVAL: CPT | Performed by: COLON & RECTAL SURGERY

## 2025-01-09 RX ORDER — MIDAZOLAM HYDROCHLORIDE 1 MG/ML
INJECTION INTRAMUSCULAR; INTRAVENOUS AS NEEDED
Status: DISCONTINUED | OUTPATIENT
Start: 2025-01-09 | End: 2025-01-09

## 2025-01-09 RX ORDER — PROPOFOL 10 MG/ML
INJECTION, EMULSION INTRAVENOUS CONTINUOUS PRN
Status: DISCONTINUED | OUTPATIENT
Start: 2025-01-09 | End: 2025-01-09

## 2025-01-09 RX ORDER — FENTANYL CITRATE 50 UG/ML
INJECTION, SOLUTION INTRAMUSCULAR; INTRAVENOUS AS NEEDED
Status: DISCONTINUED | OUTPATIENT
Start: 2025-01-09 | End: 2025-01-09

## 2025-01-09 RX ADMIN — SODIUM CHLORIDE, SODIUM LACTATE, POTASSIUM CHLORIDE, AND CALCIUM CHLORIDE: .6; .31; .03; .02 INJECTION, SOLUTION INTRAVENOUS at 15:48

## 2025-01-09 RX ADMIN — FENTANYL CITRATE 25 MCG: 50 INJECTION, SOLUTION INTRAMUSCULAR; INTRAVENOUS at 16:09

## 2025-01-09 RX ADMIN — MIDAZOLAM HYDROCHLORIDE 2 MG: 1 INJECTION, SOLUTION INTRAMUSCULAR; INTRAVENOUS at 15:49

## 2025-01-09 RX ADMIN — FENTANYL CITRATE 25 MCG: 50 INJECTION, SOLUTION INTRAMUSCULAR; INTRAVENOUS at 15:58

## 2025-01-09 RX ADMIN — FENTANYL CITRATE 50 MCG: 50 INJECTION, SOLUTION INTRAMUSCULAR; INTRAVENOUS at 15:54

## 2025-01-09 RX ADMIN — PROPOFOL 300 MCG/KG/MIN: 10 INJECTION, EMULSION INTRAVENOUS at 15:54

## 2025-01-09 RX ADMIN — PROPOFOL 20 MG: 10 INJECTION, EMULSION INTRAVENOUS at 15:58

## 2025-01-09 ASSESSMENT — COLUMBIA-SUICIDE SEVERITY RATING SCALE - C-SSRS
1. IN THE PAST MONTH, HAVE YOU WISHED YOU WERE DEAD OR WISHED YOU COULD GO TO SLEEP AND NOT WAKE UP?: NO
2. HAVE YOU ACTUALLY HAD ANY THOUGHTS OF KILLING YOURSELF?: NO
6. HAVE YOU EVER DONE ANYTHING, STARTED TO DO ANYTHING, OR PREPARED TO DO ANYTHING TO END YOUR LIFE?: NO

## 2025-01-09 ASSESSMENT — PAIN SCALES - GENERAL
PAINLEVEL_OUTOF10: 0 - NO PAIN
PAINLEVEL_OUTOF10: 0 - NO PAIN

## 2025-01-09 ASSESSMENT — PAIN - FUNCTIONAL ASSESSMENT: PAIN_FUNCTIONAL_ASSESSMENT: 0-10

## 2025-01-09 NOTE — SIGNIFICANT EVENT
Patient arrived to [facility] after procedure with Anesthesia and procedure RN, procedure discussed, plan reviewed, VSS

## 2025-01-09 NOTE — LETTER
[phone]    Dear Ms. Paulson,      It was my pleasure to see you again at your recent colonoscopy.  At that time,  you were noted to have 2 polyps in the transverse colon.  The polyps were completely excised and pathology returned the adenomatous type.  Which are a precancerous type of polyp if not removed, but yours were completely resected.  The current recommendation is to repeat the colonoscopy in 3 years.       Thank you very much for allowing me to take part in your care, please feel free to contact me with any questions or concerns at [phone].          Sincerely,       Sparkle Naylor M.D. FACS, FASCRS    CC:  Primary Care:

## 2025-01-09 NOTE — ANESTHESIA PREPROCEDURE EVALUATION
Patient: Humaira Paulson    Procedure Information       Date/Time: 25 1420    Scheduled providers: Sparkle Naylor MD; Zac Olvera MD; HARPREET Ferguson    Procedure: COLONOSCOPY    Location: [facility]            Relevant Problems   Endocrine   (+) Hypothyroidism      Musculoskeletal   (+) Generalized osteoarthritis of multiple sites       Clinical information reviewed:   Tobacco  Allergies  Meds   Med Hx  Surg Hx  OB Status  Fam Hx  Soc   Hx         Past Medical History:   Diagnosis Date    Arthritis     Bronchitis 2024    Depression     External hemorrhoids 2013    GERD (gastroesophageal reflux disease)     HL (hearing loss)     Visual impairment       Past Surgical History:   Procedure Laterality Date    COLONOSCOPY      WNL    OTHER SURGICAL HISTORY  2021    Thyroid biopsy    TIBIA FRACTURE SURGERY Right 2019    TONSILLECTOMY       Social History     Tobacco Use    Smoking status: Former     Current packs/day: 0.00     Average packs/day: 1 pack/day for 10.0 years (10.0 ttl pk-yrs)     Types: Cigarettes     Start date: 1974     Quit date:      Years since quittin.0    Smokeless tobacco: Never   Vaping Use    Vaping status: Never Used   Substance Use Topics    Alcohol use: Yes     Alcohol/week: 2.0 standard drinks of alcohol     Types: 2 Glasses of wine per week     Comment: SOCIAL    Drug use: Yes     Frequency: 1.0 times per week     Types: Marijuana     Comment: MM CARD      Current Outpatient Medications   Medication Instructions    polyethylene glycol (GaviLyte-G) 236-22.74-6.74 -5.86 gram solution Please refer to the printed instructions that were mailed to you.      No Known Allergies     Chemistry    Lab Results   Component Value Date/Time     2024 1234    K 4.1 2024 1234     2024 1234    CO2 25 2024 1234    BUN 15 2024 1234    CREATININE 0.68 2024 1234    Lab Results   Component Value  "Date/Time    CALCIUM 9.5 04/12/2024 1234    ALKPHOS 69 04/12/2024 1234    AST 23 04/12/2024 1234    ALT 35 04/12/2024 1234    BILITOT 0.4 04/12/2024 1234          No results found for: \"HGBA1C\"  Lab Results   Component Value Date/Time    WBC 6.2 04/12/2024 1234    HGB 12.3 04/12/2024 1234    HCT 39.5 04/12/2024 1234     04/12/2024 1234     No results found for: \"PROTIME\", \"PTT\", \"INR\"  No results found for: \"ABORH\"  No results found for this or any previous visit (from the past 4464 hours).  No results found for this or any previous visit from the past 1095 days.       Visit Vitals  /72   Pulse 72   Temp 36 °C (96.8 °F)   Resp 16   Ht 1.702 m (5' 7\")   Wt 67.8 kg (149 lb 7.6 oz)   LMP  (LMP Unknown)   SpO2 90%   BMI 23.41 kg/m²   OB Status Postmenopausal   Smoking Status Former   BSA 1.79 m²     NPO/Void Status  Carbohydrate Drink Given Prior to Surgery? : N  Date of Last Liquid: 01/08/25  Time of Last Liquid: 0800  Date of Last Solid: 01/09/25  Time of Last Solid: 1020  Last Intake Type: Clear fluids  Time of Last Void: 1426        Physical Exam    Airway  Mallampati: II  TM distance: >3 FB  Neck ROM: full     Cardiovascular - normal exam  Rhythm: regular  Rate: normal     Dental    Pulmonary - normal exam     Abdominal - normal exam             Anesthesia Plan    History of general anesthesia?: yes  History of complications of general anesthesia?: no    ASA 2     MAC   (Standard ASA monitoring.)  intravenous induction   Anesthetic plan and risks discussed with patient.    Plan discussed with CRNA and CAA.        "

## 2025-01-09 NOTE — H&P
"History Of Present Illness  Humaira Paulson is a 69 y.o. female presenting with Hx of TA > 1 cm.     Past Medical History  Past Medical History:   Diagnosis Date    Arthritis     Bronchitis 11/19/2024    Depression     External hemorrhoids 02/19/2013    GERD (gastroesophageal reflux disease)     HL (hearing loss)     Visual impairment      Surgical History  Past Surgical History:   Procedure Laterality Date    COLONOSCOPY  2020    WNL    OTHER SURGICAL HISTORY  03/01/2021    Thyroid biopsy    TIBIA FRACTURE SURGERY Right 09/12/2019    TONSILLECTOMY       Social History  She reports that she quit smoking about 41 years ago. Her smoking use included cigarettes. She started smoking about 51 years ago. She has a 10 pack-year smoking history. She has never used smokeless tobacco. She reports current alcohol use of about 2.0 standard drinks of alcohol per week. She reports current drug use. Frequency: 1.00 time per week. Drug: Marijuana.    Family History  Family History   Problem Relation Name Age of Onset    Hypertension Mother Naomi     Hypertension Father Revelle     Stroke Father Revelle     Hyperlipidemia Father Revelle     Heart disease Father Revelle     Breast cancer Neg Hx      Ovarian cancer Neg Hx          Allergies  No Known Allergies  Review of Systems     Physical Exam   Constitutional: Well developed, awake/alert/oriented x3, no distress, alert and cooperative   CV:  RRR  Lungs:  No audible wheezing, no tachypnea, chest symmetric, no increased WOB  Gastrointestinal: soft,  nontender  Neurological: alert and oriented     Last Recorded Vitals  Blood pressure 149/72, pulse 72, temperature 36 °C (96.8 °F), resp. rate 16, height 1.702 m (5' 7\"), weight 67.8 kg (149 lb 7.6 oz), SpO2 90%.    Assessment/Plan   colonoscopy     Sparkle Naylor MD  "

## 2025-01-09 NOTE — ANESTHESIA POSTPROCEDURE EVALUATION
Patient: Humaira Paulson    Procedure Summary       Date: 01/09/25 Room / Location: [facility]    Anesthesia Start: 1546 Anesthesia Stop: 1634    Procedure: COLONOSCOPY Diagnosis: Screening for colon cancer    Scheduled Providers: Sparkle Naylor MD; Zac Olvera MD; HARPREET Ferguson Responsible Provider: Rich Bautista MD    Anesthesia Type: MAC ASA Status: 2            Anesthesia Type: MAC    Vitals Value Taken Time   /70 01/09/25 1700   Temp 36 °C (96.8 °F) 01/09/25 1627   Pulse 64 01/09/25 1700   Resp 17 01/09/25 1657   SpO2 97 % 01/09/25 1657   Vitals shown include unfiled device data.    Anesthesia Post Evaluation    Patient location during evaluation: PACU  Patient participation: complete - patient participated  Level of consciousness: awake  Pain management: adequate  Airway patency: patent  Cardiovascular status: acceptable  Respiratory status: acceptable  Hydration status: acceptable  Postoperative Nausea and Vomiting: none    No notable events documented.

## 2025-01-09 NOTE — DISCHARGE INSTRUCTIONS
Patient Instructions after a Colonoscopy      The anesthetics, sedatives or narcotics which were given to you today will be acting in your body for the next 24 hours, so you might feel a little sleepy or groggy.  This feeling should slowly wear off. Carefully read and follow the instructions.     You received sedation today:  - Do not drive or operate any machinery or power tools of any kind.   - No alcoholic beverages today, not even beer or wine.  - Do not make any important decisions or sign any legal documents.  - No over the counter medications that contain alcohol or that may cause drowsiness.  - Do not make any important decisions or sign any legal documents.  - Make sure you have someone with you for first 24 hours.    While it is common to experience mild to moderate abdominal distention, gas, or belching after your procedure, if any of these symptoms occur following discharge from the GI Lab or within one week of having your procedure, call the [facility] to be advised whether a visit to your nearest Urgent Care or Emergency Department is indicated.  Take this paper with you if you go.     - If you develop an allergic reaction to the medications that were given during your procedure such as difficulty breathing, rash, hives, severe nausea, vomiting or lightheadedness.  - If you experience chest pain, shortness of breath, severe abdominal pain, fevers and chills.  -If you develop signs and symptoms of bleeding such as blood in your spit, if your stools turn black, tarry, or bloody  - If you have not urinated within 8 hours following your procedure.  - If your IV site becomes painful, red, inflamed, or looks infected.    If you received a biopsy/polypectomy/sphincterotomy the following instructions apply below:    __ Do not use Aspirin containing products, non-steroidal medications or anti-coagulants for one week following your procedure. (Examples of these types of medications are: Advil,  Arthrotec, Aleve, Coumadin, Ecotrin, Heparin, Ibuprofen, Indocin, Motrin, Naprosyn, Nuprin, Plavix, Vioxx, and Voltarin, or their generic forms.  This list is not all-inclusive.  Check with your physician or pharmacist before resuming medications.)   __ Eat a soft diet today.  Avoid foods that are poorly digested for the next 24 hours.  These foods would include: nuts, beans, lettuce, red meats, and fried foods. Start with liquids and advance your diet as tolerated, gradually work up to eating solids.   __ Do not have a Barium Study or Enema for one week.    Your physician recommends the additional following instructions:    -You have a contact number available for emergencies. The signs and symptoms of potential delayed complications were discussed with you. You may return to normal activities tomorrow.  -Resume your previous diet.  -Continue your present medications.   -We are waiting for your pathology results.  -Your physician has recommended a repeat colonoscopy (date to be determined after pending pathology results are reviewed) for surveillance based on pathology results.  -The findings and recommendations have been discussed with you.  -The findings and recommendations were discussed with your family.  - Please see Medication Reconciliation Form for new medication/medications prescribed.       If you experience any problems or have any questions following discharge from the GI Lab, please call:        Nurse Signature                                                                        Date___________________                                                                            Patient/Responsible Party Signature                                        Date___________________

## 2025-01-13 ENCOUNTER — APPOINTMENT (OUTPATIENT)
Dept: SURGERY | Facility: CLINIC | Age: 70
End: 2025-01-13
Payer: MEDICARE

## 2025-01-15 LAB
LABORATORY COMMENT REPORT: NORMAL
PATH REPORT.FINAL DX SPEC: NORMAL
PATH REPORT.GROSS SPEC: NORMAL
PATH REPORT.RELEVANT HX SPEC: NORMAL
PATH REPORT.TOTAL CANCER: NORMAL

## 2025-01-28 ENCOUNTER — APPOINTMENT (OUTPATIENT)
Dept: SURGERY | Facility: CLINIC | Age: 70
End: 2025-01-28
Payer: MEDICARE

## 2025-01-30 ENCOUNTER — APPOINTMENT (OUTPATIENT)
Dept: DERMATOLOGY | Facility: CLINIC | Age: 70
End: 2025-01-30
Payer: MEDICARE

## 2025-01-30 DIAGNOSIS — L56.4 POLYMORPHIC LIGHT ERUPTION: Primary | ICD-10-CM

## 2025-01-30 DIAGNOSIS — B07.8 OTHER VIRAL WARTS: ICD-10-CM

## 2025-01-30 DIAGNOSIS — L81.4 LENTIGO: ICD-10-CM

## 2025-01-30 PROCEDURE — 1159F MED LIST DOCD IN RCRD: CPT | Performed by: STUDENT IN AN ORGANIZED HEALTH CARE EDUCATION/TRAINING PROGRAM

## 2025-01-30 PROCEDURE — 99204 OFFICE O/P NEW MOD 45 MIN: CPT | Performed by: STUDENT IN AN ORGANIZED HEALTH CARE EDUCATION/TRAINING PROGRAM

## 2025-01-30 PROCEDURE — 1123F ACP DISCUSS/DSCN MKR DOCD: CPT | Performed by: STUDENT IN AN ORGANIZED HEALTH CARE EDUCATION/TRAINING PROGRAM

## 2025-01-30 RX ORDER — DESONIDE 0.5 MG/G
CREAM TOPICAL 2 TIMES DAILY
Qty: 60 G | Refills: 3 | Status: SHIPPED | OUTPATIENT
Start: 2025-01-30

## 2025-01-30 NOTE — PROGRESS NOTES
Subjective     Humaira Paulson is a 69 y.o. female who presents for the following: Suspicious Skin Lesion (Patient is concerned with dark areas on face. Occasionally will apply OTC Retin-A to face. She is also concerned with bottom of left heel which is dry and irritated.).     Review of Systems:  No other skin or systemic complaints other than what is documented elsewhere in the note.    The following portions of the chart were reviewed this encounter and updated as appropriate:          Skin Cancer History  No skin cancer on file.      Specialty Problems    None       Objective   Well appearing patient in no apparent distress; mood and affect are within normal limits.    A focused skin examination was performed. All findings within normal limits unless otherwise noted below.    Assessment/Plan   1. Polymorphic light eruption  Head - Anterior (Face)  History of red itchy rash that appears every spring  Patient reports to face only  Chart review shows some notes indicating appearing on arms too  Discussed possible diagnosis of pmle, relation to northern latitudes and sun exposure, role of hardening of skin, use of sun protection    Desonide as needed for rash recurrence, to reach out if unable to control    desonide (DesOwen) 0.05 % cream - Head - Anterior (Face)  Apply topically 2 times a day. As needed for rash on face until clear    2. Lentigo  Head - Anterior (Face)  Scattered tan macules in sun-exposed areas.    Benign nature of these skin lesions reviewed and relation to sun exposure discussed. Reassurance provided. Reviewed warning signs of skin cancer.    Strict sun protection  Start kojic acid 0.05%/hydroquinone 7%/hydrocortisone 0.05%/tretinoin 0.05% cream nightly x 2 months on then 1 month off.  Discussed risk of irritation. Discussed risk of hyperpigmentation with prolonged use.       3. Other viral warts  Left Plantar Surface of Heel  Multiple verrucous papules    Reviewed viral etiology and  transmissible nature of the condition. Reviewed treatment options including LN2 cryotherapy, observation, WartPeel, imiquimod, intralesional therapies, shave removal, and laser treatment.    Declined treatment today

## 2025-02-13 ENCOUNTER — TELEPHONE (OUTPATIENT)
Dept: PRIMARY CARE | Facility: CLINIC | Age: 70
End: 2025-02-13
Payer: MEDICARE

## 2025-02-13 DIAGNOSIS — H93.13 TINNITUS OF BOTH EARS: Primary | ICD-10-CM

## 2025-02-13 NOTE — TELEPHONE ENCOUNTER
Patient has ringing in the ears that's keeping her awake for 6 months. Patient would like to know if she Should she see an audiologist or buy things she's seeing on the internet. Patient wears hearing aids. She doesn't have an ENT.    Please advise    Patient can be reached at [phone]

## 2025-02-14 PROBLEM — H93.13 TINNITUS OF BOTH EARS: Status: ACTIVE | Noted: 2025-02-14

## 2025-02-18 ENCOUNTER — TELEPHONE (OUTPATIENT)
Dept: DERMATOLOGY | Facility: CLINIC | Age: 70
End: 2025-02-18
Payer: MEDICARE

## 2025-02-18 NOTE — TELEPHONE ENCOUNTER
Spoke with patient and informed her that Dr. Duncan sent in a prescription for Desonide cream to her local pharmacy. She is currently in [place] and has opted to wait to  medication on Thursday when she returns.

## 2025-02-18 NOTE — TELEPHONE ENCOUNTER
Left a message for patient requesting that she take a photo of rash and upload it to [facility] so Dr. Duncan will be able to evaluate her symptoms.

## 2025-02-19 DIAGNOSIS — L56.4 POLYMORPHIC LIGHT ERUPTION: ICD-10-CM

## 2025-02-19 RX ORDER — DESONIDE 0.5 MG/G
CREAM TOPICAL 2 TIMES DAILY
Qty: 60 G | Refills: 3 | Status: SHIPPED | OUTPATIENT
Start: 2025-02-19

## 2025-03-19 ENCOUNTER — APPOINTMENT (OUTPATIENT)
Dept: DERMATOLOGY | Facility: CLINIC | Age: 70
End: 2025-03-19
Payer: MEDICARE

## 2025-03-19 DIAGNOSIS — L56.8 PHOTOALLERGIC DERMATITIS: Primary | ICD-10-CM

## 2025-03-19 PROCEDURE — 1159F MED LIST DOCD IN RCRD: CPT | Performed by: STUDENT IN AN ORGANIZED HEALTH CARE EDUCATION/TRAINING PROGRAM

## 2025-03-19 PROCEDURE — 99214 OFFICE O/P EST MOD 30 MIN: CPT | Performed by: STUDENT IN AN ORGANIZED HEALTH CARE EDUCATION/TRAINING PROGRAM

## 2025-03-19 PROCEDURE — 1123F ACP DISCUSS/DSCN MKR DOCD: CPT | Performed by: STUDENT IN AN ORGANIZED HEALTH CARE EDUCATION/TRAINING PROGRAM

## 2025-03-19 RX ORDER — PREDNISONE 20 MG/1
40 TABLET ORAL EVERY MORNING
Qty: 28 TABLET | Refills: 0 | Status: SHIPPED | OUTPATIENT
Start: 2025-03-19 | End: 2025-04-02

## 2025-03-19 NOTE — PROGRESS NOTES
Subjective     Humaira Paulson is a 69 y.o. female who presents for the following: Rash (Patient has a history of red, itchy rash which appears each spring. Currently treating with Desonide. She was in [place] recently and developed the rash again. Patient states that she stays in shade, wears large hats, and uses Neutrogena Zinc sunscreen. ).     Review of Systems:  No other skin or systemic complaints other than what is documented elsewhere in the note.    The following portions of the chart were reviewed this encounter and updated as appropriate:          Skin Cancer History  No skin cancer on file.      Specialty Problems    None       Objective   Well appearing patient in no apparent distress; mood and affect are within normal limits.    A focused skin examination was performed. All findings within normal limits unless otherwise noted below.    Assessment/Plan   1. Photoallergic dermatitis  Head - Anterior (Face)  Face with diffuse erythematous slightly scaly plaques  Sparing of submental area  Photodistributed on face    Favor photoallergic   Most commonly due to sunscreen ingredients  Using neutrogena face sunscreen, using different sunscreen elsewhere  Did also start a retinol product several months ago prior to first trip to [place], but stopped this soon after rash appeared  No systemic photosensitizing meds  Autoimmune work up last year, JERILYN + 1:80 but rheum deemed likely not clinically relevant  Chest, neck, hands no affected   Prednisone 40 mg x 1 week (1 refill provided if needed)  Start heliocare  Mineral only sunscreen  Traveling to [place], advised strict sun protection. To call if worsening    predniSONE (Deltasone) 20 mg tablet - Head - Anterior (Face)  Take 2 tablets (40 mg) by mouth once daily in the morning for 14 days.

## 2025-05-28 ENCOUNTER — APPOINTMENT (OUTPATIENT)
Dept: AUDIOLOGY | Facility: CLINIC | Age: 70
End: 2025-05-28
Payer: MEDICARE

## 2025-05-28 ENCOUNTER — APPOINTMENT (OUTPATIENT)
Dept: OTOLARYNGOLOGY | Facility: CLINIC | Age: 70
End: 2025-05-28
Payer: MEDICARE

## 2025-05-28 VITALS — HEIGHT: 67 IN | WEIGHT: 152 LBS | BODY MASS INDEX: 23.86 KG/M2 | TEMPERATURE: 97.4 F

## 2025-05-28 DIAGNOSIS — H90.3 BILATERAL SENSORINEURAL HEARING LOSS: Primary | ICD-10-CM

## 2025-05-28 DIAGNOSIS — H90.3 SENSORINEURAL HEARING LOSS (SNHL) OF BOTH EARS: ICD-10-CM

## 2025-05-28 DIAGNOSIS — H93.13 TINNITUS OF BOTH EARS: Primary | ICD-10-CM

## 2025-05-28 PROCEDURE — 1159F MED LIST DOCD IN RCRD: CPT | Performed by: OTOLARYNGOLOGY

## 2025-05-28 PROCEDURE — 3008F BODY MASS INDEX DOCD: CPT | Performed by: OTOLARYNGOLOGY

## 2025-05-28 PROCEDURE — 92557 COMPREHENSIVE HEARING TEST: CPT | Performed by: AUDIOLOGIST

## 2025-05-28 PROCEDURE — 99203 OFFICE O/P NEW LOW 30 MIN: CPT | Performed by: OTOLARYNGOLOGY

## 2025-05-28 PROCEDURE — 92567 TYMPANOMETRY: CPT | Performed by: AUDIOLOGIST

## 2025-05-28 PROCEDURE — 1160F RVW MEDS BY RX/DR IN RCRD: CPT | Performed by: OTOLARYNGOLOGY

## 2025-05-28 NOTE — PROGRESS NOTES
"HPI  Humaira Paulson is a 69 y.o. female history of bilateral sensorineural hearing loss and has had hearing aids for couple years now.  Does not feel like they are helping her as much as she wishes.  She had an audiogram 2019 at [facility] with bilateral sensorineural hearing loss.  More recently with bilateral sensorineural hearing loss as well.  Denies vertigo.  Worried about dementia.            Medical History[1]         Medications:   Current Medications[2]     Allergies:  Allergies[3]     Physical Exam:  Last Recorded Vitals  Temperature 36.3 °C (97.4 °F), height 1.702 m (5' 7\"), weight 68.9 kg (152 lb).  General:     General appearance: Well-developed, well-nourished in no acute distress.       Voice:  normal       Head/face: Normal appearance; nontender to palpation     Facial nerve function: Normal and symmetric bilaterally.    Oral/oropharynx:     Oral vestibule: Normal labial and gingival mucosa     Tongue/floor of mouth: Normal without lesion     Oropharynx: Clear.  No lesions present of the hard/soft palate, posterior pharynx    Neck:     Neck: Normal appearance, trachea midline     Salivary glands: Normal to palpation bilaterally     Lymph nodes: No cervical lymphadenopathy to palpation     Thyroid: No thyromegaly.  No palpable nodules     Range of motion: Normal    Neurological:     Cortical functions: Alert and oriented x3, appropriate affect       Larynx/hypopharynx:     Laryngeal findings: Mirror exam inadequate or limited secondary to enlarged base of tongue and/or excessive gagging    Ear:     Ear canal: Normal bilaterally     Tympanic membrane: Intact and mobile bilaterally     Pinna: Normal bilaterally     Hearing:  Gross hearing assessment normal by voice    Nose:     Visualized using: Anterior rhinoscopy     Nasopharynx: Inadequate mirror exam secondary to gag, anatomy.       Nasal dorsum: Nontraumatic midline appearance     Septum: Midline     Inferior turbinates: Normally sized     Mucosa: " Bilateral, pink, normal appearing       ASSESSMENT/PLAN:  I have recommended that she return to have her hearing aids adjusted.  No pathology on physical examination of the ears.  Update audiogram annually.  Recheck sooner as needed        Roge Wagner MD         [1]   Past Medical History:  Diagnosis Date    Arthritis     Bronchitis 11/19/2024    Depression     External hemorrhoids 02/19/2013    GERD (gastroesophageal reflux disease)     HL (hearing loss)     Visual impairment    [2]   Current Outpatient Medications:     desonide (DesOwen) 0.05 % cream, Apply topically 2 times a day. As needed for rash on face until clear, Disp: 60 g, Rfl: 3  [3] No Known Allergies

## 2025-06-02 NOTE — PROGRESS NOTES
AUDIOLOGY ADULT AUDIOMETRIC EVALUATION    Name:  Humaira Paulson  :  1955  Age:  69 y.o.  Date of Evaluation:  May 28, 2025    Reason for visit: Ms. Paulson is seen in the clinic today at the request of Roge Wagner MD in otolaryngology for an audiologic evaluation.     HISTORY  The patient wears binaural hearing aids, but she reported that she is not hearing well with them.  Intermittent bilateral tinnitus was reported.    EVALUATION  See scanned audiogram: “Media” > “Audiology Report”.      RESULTS  Otoscopic Evaluation:  Right Ear: clear ear canal  Left Ear: clear ear canal    Immittance Measures:  Tympanometry:  Right Ear: Type A, normal tympanic membrane mobility with normal middle ear pressure   Left Ear: Type A, normal tympanic membrane mobility with normal middle ear pressure     Acoustic Reflexes:  Ipsilateral Right Ear: Could not evaluate since an adequate seal could not be maintained    Ipsilateral Left Ear: Could not evaluate since an adequate seal could not be maintained    Contralateral Right Ear: did not evaluate  Contralateral Left Ear: did not evaluate    Distortion Product Otoacoustic Emissions (DPOAEs):  Right Ear: Present at 6450-0804 Hz; absent at 9754-8839 and 8000 Hz  Left Ear: Present at 7462-9492 Hz; absent at 1000 and 6843-5001 Hz    Audiometry:  Test Technique and Reliability:   Standard audiometry via supra-aural headphones. Reliability is good.    Pure tone air and bone conduction audiometry:  Right Ear: mild sensorineural hearing loss at 250-1000 Hz, rising to normal hearing at 5789-3094 Hz, sloping to a mild to moderate sensorineural hearing loss at 0789-3641 Hz  Left Ear: mild sensorineural hearing loss at 750-1000 Hz, rising to normal hearing at 7649-3391 Hz, sloping to a mild to moderately-severe sensorineural hearing loss at 1437-0747 Hz    Speech Audiometry (Word Recognition Scores):   Right Ear: Excellent, 100% in quiet at an elevated presentation level   Left Ear:  Excellent, 100% in quiet at an elevated presentation level     IMPRESSIONS  Results of today's audiometric evaluation revealed a bilateral sensorineural hearing loss.  No prior audiologic evaluation is available for comparison.  Results of tympanometry testing indicated normal middle ear function in both ears.   Present DPOAEs suggest normal/near normal cochlear outer hair cell function and are consistent with no greater than a mild hearing loss at those frequencies. Absent DPOAEs are consistent with abnormal cochlear outer hair cell function at those frequencies.    RECOMMENDATIONS  - Follow up with otolaryngology today as scheduled.  - Audiologic evaluation in conjunction with otologic care, if an acute change is noted, and/or annually.  - Continued hearing aid use.    - Follow-up with audiology annually for routine hearing aid maintenance, sooner if questions/problems arise.  - Follow-up with medical care team as planned.    PATIENT EDUCATION  Discussed results, impressions and recommendations with the patient. Questions were addressed and the patient was encouraged to contact our office should concerns arise.    Time for this encounter: 10:30-11:00    Olimpia Dior M.A., CCC-A   Licensed Audiologist

## 2025-07-22 ENCOUNTER — OFFICE VISIT (OUTPATIENT)
Dept: URGENT CARE | Age: 70
End: 2025-07-22
Payer: MEDICARE

## 2025-07-22 VITALS
BODY MASS INDEX: 22.73 KG/M2 | HEIGHT: 68 IN | SYSTOLIC BLOOD PRESSURE: 121 MMHG | HEART RATE: 67 BPM | WEIGHT: 150 LBS | RESPIRATION RATE: 18 BRPM | DIASTOLIC BLOOD PRESSURE: 79 MMHG | TEMPERATURE: 97.9 F | OXYGEN SATURATION: 97 %

## 2025-07-22 DIAGNOSIS — S00.86XA INSECT BITE OF OTHER PART OF HEAD, INITIAL ENCOUNTER: Primary | ICD-10-CM
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
## 2025-07-22 RX ORDER — MUPIROCIN 20 MG/G
OINTMENT TOPICAL
Qty: 22 G | Refills: 0 | Status: SHIPPED | OUTPATIENT
Start: 2025-07-22 | End: 2025-08-01

## 2025-07-22 RX ORDER — CETIRIZINE HYDROCHLORIDE 10 MG/1
10 TABLET ORAL EVERY 12 HOURS
Qty: 30 TABLET | Refills: 0 | Status: SHIPPED | OUTPATIENT
Start: 2025-07-22 | End: 2025-08-06

## 2025-07-22 RX ORDER — SULFAMETHOXAZOLE AND TRIMETHOPRIM 800; 160 MG/1; MG/1
1 TABLET ORAL 2 TIMES DAILY
Qty: 14 TABLET | Refills: 0 | Status: SHIPPED | OUTPATIENT
Start: 2025-07-22

## 2025-07-22 RX ORDER — PREDNISONE 10 MG/1
TABLET ORAL
Qty: 30 TABLET | Refills: 0 | Status: SHIPPED | OUTPATIENT
Start: 2025-07-22 | End: 2025-08-01

## 2025-07-22 ASSESSMENT — ENCOUNTER SYMPTOMS
CHEST TIGHTNESS: 0
HEADACHES: 0
DIZZINESS: 0
SINUS PAIN: 0
FEVER: 0
WOUND: 1
SHORTNESS OF BREATH: 0
DIARRHEA: 0
VOMITING: 0
BACK PAIN: 0
RHINORRHEA: 0
DIFFICULTY URINATING: 0
NECK PAIN: 0
NAUSEA: 0
FATIGUE: 0
COUGH: 0
ABDOMINAL PAIN: 0
CHILLS: 0
EYE PAIN: 0
SINUS PRESSURE: 0
TROUBLE SWALLOWING: 0
MYALGIAS: 0
PALPITATIONS: 0
CONSTIPATION: 0
SORE THROAT: 0
ARTHRALGIAS: 0
APPETITE CHANGE: 0

## 2025-07-22 ASSESSMENT — VISUAL ACUITY: OU: 1

## 2025-07-22 NOTE — PROGRESS NOTES
Subjective   Patient ID: Humaira Paulson is a 69 y.o. female. They present today with a chief complaint of Insect Bite (Bit by an insect last night and getting worse).    History of Present Illness  The patient is a 68yo female who presents with an insect bite. The patient states that she was bit by an insect last night and it is getting worse.            Past Medical History  Allergies as of 07/22/2025   • (No Known Allergies)       Prescriptions Prior to Admission[1]     Medical History[2]    Surgical History[3]     reports that she quit smoking about 41 years ago. Her smoking use included cigarettes. She started smoking about 51 years ago. She has a 10 pack-year smoking history. She has never used smokeless tobacco. She reports current alcohol use of about 2.0 standard drinks of alcohol per week. She reports current drug use. Frequency: 1.00 time per week. Drug: Marijuana.    Review of Systems  Review of Systems   Constitutional:  Negative for appetite change, chills, fatigue and fever.   HENT:  Negative for congestion, dental problem, ear pain, hearing loss, postnasal drip, rhinorrhea, sinus pressure, sinus pain, sneezing, sore throat and trouble swallowing.    Eyes:  Negative for pain.   Respiratory:  Negative for cough, chest tightness and shortness of breath.    Cardiovascular:  Negative for chest pain and palpitations.   Gastrointestinal:  Negative for abdominal pain, constipation, diarrhea, nausea and vomiting.   Genitourinary:  Negative for difficulty urinating.   Musculoskeletal:  Negative for arthralgias, back pain, gait problem, myalgias and neck pain.   Skin:  Positive for wound. Negative for rash.   Neurological:  Negative for dizziness and headaches.   Psychiatric/Behavioral:  Negative for self-injury and suicidal ideas.                                   Objective    Vitals:    07/22/25 1130   BP: 121/79   Pulse: 67   Resp: 18   Temp: 36.6 °C (97.9 °F)   SpO2: 97%   Weight: 68 kg (150 lb)   Height:  "1.727 m (5' 8\")     No LMP recorded (lmp unknown). Patient is postmenopausal.    Physical Exam  Vitals reviewed.   Constitutional:       General: She is awake. She is not in acute distress.     Appearance: Normal appearance. She is well-developed, well-groomed and normal weight. She is not ill-appearing.   HENT:      Head: Normocephalic and atraumatic.      Right Ear: Hearing and external ear normal.      Left Ear: Hearing and external ear normal.      Nose: Nose normal. No nasal deformity or signs of injury.      Mouth/Throat:      Lips: Pink.     Eyes:      General: Lids are normal. Vision grossly intact.       Cardiovascular:      Rate and Rhythm: Normal rate and regular rhythm.      Heart sounds: Normal heart sounds, S1 normal and S2 normal. Heart sounds not distant. No murmur heard.  Pulmonary:      Effort: Pulmonary effort is normal. No tachypnea, bradypnea, accessory muscle usage, prolonged expiration, respiratory distress or retractions.      Breath sounds: Normal breath sounds and air entry. No stridor, decreased air movement or transmitted upper airway sounds. No decreased breath sounds.   Chest:      Chest wall: No deformity.     Skin:     General: Skin is warm and dry.      Capillary Refill: Capillary refill takes less than 2 seconds.      Findings: Erythema and wound present.          Neurological:      General: No focal deficit present.      Mental Status: She is alert.      Gait: Gait is intact.     Psychiatric:         Attention and Perception: Attention and perception normal.         Mood and Affect: Mood and affect normal.         Speech: Speech normal.         Behavior: Behavior normal. Behavior is cooperative.         Procedures    Point of Care Test & Imaging Results from this visit  No results found for this visit on 07/22/25.   Imaging  No results found.    Cardiology, Vascular, and Other Imaging  No other imaging results found for the past 2 days      Diagnostic study results (if any) were " reviewed by JARRETT Patton.    Assessment/Plan   Allergies, medications, history, and pertinent labs/EKGs/Imaging reviewed by JARRETT Patton.     Medical Decision Making  Will treat as allergic reaction with tapered steroid dose and cetirizine. Mupirocen added as precaution. Paper script for Bactrim is a wait and see. Information on cellulitis given to patient. She will begin abx if symptoms worsen after beginning steroid/antihistamine treatment (48hrs).     Risks, benefits, and alternatives of the medications and treatment plan prescribed today were discussed, and patient expressed understanding. Plan follow up as discussed or as needed if any worsening symptoms or change in condition. Reinforced red flags including (but not limited to): severe or worsening abdominal pain; difficulty swallowing; stiff neck; shortness of breath; coughing or vomiting blood; chest pain; and new or increased fever are indications to go to the Emergency Department.    The patient voices understanding of all medications. No barriers to adherence. Patient is taking all medications as prescribed and tolerating well. For any new medications, the patient was instructed of directions for and consequences of not taking medication and they were informed about the potential side effects and drug interactions. The after-visit summary was given to the patient and care instructions were reviewed with the patient. All questions were answered and the patient verbalized understanding of the plan of care for today.    Orders and Diagnoses  There are no diagnoses linked to this encounter.    Medical Admin Record      Patient disposition: Home    Electronically signed by JARRETT Patton  11:36 AM             [1]  (Not in a hospital admission)  [2]  Past Medical History:  Diagnosis Date   • Arthritis    • Bronchitis 11/19/2024   • Depression    • External hemorrhoids 02/19/2013   • GERD (gastroesophageal  reflux disease)    • HL (hearing loss)    • Visual impairment    [3]  Past Surgical History:  Procedure Laterality Date   • COLONOSCOPY  2020    WNL   • OTHER SURGICAL HISTORY  03/01/2021    Thyroid biopsy   • TIBIA FRACTURE SURGERY Right 09/12/2019   • TONSILLECTOMY

## 2025-11-20 ENCOUNTER — APPOINTMENT (OUTPATIENT)
Dept: PRIMARY CARE | Facility: CLINIC | Age: 70
End: 2025-11-20
Payer: MEDICARE

## 2025-12-10 ENCOUNTER — APPOINTMENT (OUTPATIENT)
Dept: DERMATOLOGY | Facility: CLINIC | Age: 70
End: 2025-12-10
Payer: MEDICARE